# Patient Record
Sex: MALE | Race: OTHER | HISPANIC OR LATINO | URBAN - METROPOLITAN AREA
[De-identification: names, ages, dates, MRNs, and addresses within clinical notes are randomized per-mention and may not be internally consistent; named-entity substitution may affect disease eponyms.]

---

## 2017-09-16 ENCOUNTER — EMERGENCY (EMERGENCY)
Facility: HOSPITAL | Age: 32
LOS: 1 days | Discharge: HOME | End: 2017-09-16
Attending: EMERGENCY MEDICINE | Admitting: EMERGENCY MEDICINE
Payer: COMMERCIAL

## 2017-09-16 VITALS
HEART RATE: 78 BPM | SYSTOLIC BLOOD PRESSURE: 147 MMHG | RESPIRATION RATE: 18 BRPM | DIASTOLIC BLOOD PRESSURE: 92 MMHG | TEMPERATURE: 98 F | OXYGEN SATURATION: 100 %

## 2017-09-16 VITALS
OXYGEN SATURATION: 100 % | RESPIRATION RATE: 16 BRPM | TEMPERATURE: 98 F | HEART RATE: 85 BPM | SYSTOLIC BLOOD PRESSURE: 127 MMHG | DIASTOLIC BLOOD PRESSURE: 86 MMHG

## 2017-09-16 LAB
APPEARANCE UR: CLEAR — SIGNIFICANT CHANGE UP
BILIRUB UR-MCNC: NEGATIVE — SIGNIFICANT CHANGE UP
BLOOD UR QL VISUAL: NEGATIVE — SIGNIFICANT CHANGE UP
COLOR SPEC: YELLOW — SIGNIFICANT CHANGE UP
GLUCOSE UR-MCNC: NEGATIVE — SIGNIFICANT CHANGE UP
KETONES UR-MCNC: NEGATIVE — SIGNIFICANT CHANGE UP
LEUKOCYTE ESTERASE UR-ACNC: NEGATIVE — SIGNIFICANT CHANGE UP
MUCOUS THREADS # UR AUTO: SIGNIFICANT CHANGE UP
NITRITE UR-MCNC: NEGATIVE — SIGNIFICANT CHANGE UP
PH UR: 6 — SIGNIFICANT CHANGE UP (ref 4.6–8)
PROT UR-MCNC: NEGATIVE — SIGNIFICANT CHANGE UP
RBC CASTS # UR COMP ASSIST: SIGNIFICANT CHANGE UP (ref 0–?)
SP GR SPEC: 1.01 — SIGNIFICANT CHANGE UP (ref 1–1.03)
UROBILINOGEN FLD QL: NORMAL E.U. — SIGNIFICANT CHANGE UP (ref 0.1–0.2)
WBC UR QL: SIGNIFICANT CHANGE UP (ref 0–?)

## 2017-09-16 PROCEDURE — 76870 US EXAM SCROTUM: CPT | Mod: 26

## 2017-09-16 PROCEDURE — 99285 EMERGENCY DEPT VISIT HI MDM: CPT

## 2017-09-16 NOTE — ED ADULT TRIAGE NOTE - CHIEF COMPLAINT QUOTE
pt c/o right testicular pain since last night. pmd wants to r/o torsion.  pt c/o abd pain and nausea at this time.

## 2017-09-16 NOTE — ED PROVIDER NOTE - CARE PLAN
Principal Discharge DX:	Testicular pain, right  Instructions for follow-up, activity and diet:	Follow-up with your Primary Care Physician within 24-48 hours.  Please return to the Emergency Department immediately for any new, worsening or concerning symptoms; specifically those included in the attached information brochure.

## 2017-09-16 NOTE — ED PROVIDER NOTE - PLAN OF CARE
Follow-up with your Primary Care Physician within 24-48 hours.  Please return to the Emergency Department immediately for any new, worsening or concerning symptoms; specifically those included in the attached information brochure.

## 2017-09-16 NOTE — ED PROVIDER NOTE - MEDICAL DECISION MAKING DETAILS
32 year-old male presents to the ED for testicular pain. 32 year-old male presents to the ED for testicular pain since last night (> 6 hours).    DDx: testicular torsion, varicocele, epididymitis, hernia, muscle train, kidney stone, infection  Plan to rule-out torsion with US and check UA for eval of UTI/infection.  Follow-up with urology/PCP if testing negative.

## 2017-09-16 NOTE — ED ADULT NURSE NOTE - OBJECTIVE STATEMENT
Alert and oriented x 4. Pt received to spot 23 to R/O Testicular Torsion. Pt states : " I have testicular pain for the past 3 days. I went to PMD and he sent me to ER. No pain at this time." Pt states pain comes and goes and is 9/10 when he has the pain. Pt also experiences nausea when hes in pain. Pt denies chest pain, shortness of breath, nausea, vomiting or dizziness. VSS. Wife at bedside. Will continue to monitor.

## 2017-09-16 NOTE — ED PROVIDER NOTE - PHYSICAL EXAMINATION
*Gen: NAD, AAO*3, well-appearing, well-nourished  *HEENT: NC/AT, MMM, airway patent, trachea midline  *CV: RRR, S1/S2 present, no murmurs/rubs/gallops  *Resp: no respiratory distress, LCTAB, no wheezing/rales/rhonchi  *Abd: non-distended, soft N/Tx4, no guarding or rigidity  *Neuro: no focal neuro deficits, moving all limbs appropriately  *Extremities: no gross deformity, PMS*4  *Skin: no rashes, no wounds   ~ Henna Clark M.D. *Gen: NAD, AAO*3, well-appearing, well-nourished  *HEENT: NC/AT, MMM, airway patent, trachea midline  *CV: RRR, S1/S2 present, no murmurs/rubs/gallops  *Resp: no respiratory distress, LCTAB, no wheezing/rales/rhonchi  *Abd: non-distended, soft N/Tx4, no guarding or rigidity  *Neuro: no focal neuro deficits, moving all limbs appropriately  *Extremities: no gross deformity, PMS*4  *Skin: no rashes, no wounds   G/U: penis without lesions, urethral meatus normal location without discharge, testes and epididymides normal size without masses, right testicle high-riding and with lateral position, scrotum without lesions - no overlying redness/discoloration, cremasteric reflex not present, no inguinal hernia appreciated bilaterally.  ~ Henna Clark M.D.

## 2017-09-16 NOTE — ED PROVIDER NOTE - CHIEF COMPLAINT
The patient is a 32y Male complaining of The patient is a 32y Male complaining of right sided testicular pain.

## 2017-09-16 NOTE — ED PROVIDER NOTE - OBJECTIVE STATEMENT
31yo 32 year-old male w/ no pertinent past medical history presents to the ED for right sided testicular pain.  Patient 32 year-old male w/ no pertinent past medical history presents to the ED for right sided testicular pain.  Patient mentions he was moving around on the sofa last night when he had sudden onset stabbing right testicular pain with radiation into the right inguinal region.  Pain has been persistent since then with some worsening this AM.  Went to a urologist who sent patient to ED to rule-out testicular torsion.  No fevers, chills, vomiting, urinary disturbances.  Occasional nausea.  Non-bloody diarrhea since yesterday.  Prior history of kidney stones - patient says current episode does not feel like his prior episode.  No history of STDs - no current drainage.  No history of inguinal hernias.

## 2017-09-16 NOTE — ED PROVIDER NOTE - ATTENDING CONTRIBUTION TO CARE
I performed a face to face bedside interview with patient regarding history of present illness, review of symptoms and past medical history. I completed an independent physical exam.  I have discussed patient's plan of care.   I agree with note as stated above, having amended the EMR as needed to reflect my findings. I have discussed the assessment and plan of care.  This includes during the time I functioned as the attending physician for this patient.  Attending Contribution to Care: agree with plan of resident, pt p/w r testicular pain since yesterday. aching. no discharge, hematuria, polyuria, dysuria. pt stable for d/c pending neg us, ua

## 2017-09-16 NOTE — ED PROVIDER NOTE - NS ED ROS FT
*Constitutional: no fevers, no chills  *CV: no chest pain, no palpitations, no lightheadedness  *Resp: no shortness of breath, no cough, no wheezing  *GI: no abdominal pain, no nausea, no vomiting, no diarrhea, no constipation  *G/U: no dysuria, no hematuria  *Neuro: no headache, no lightheadedness/dizziness  *MSK: no joint pain, no swelling, no redness  *Skin: no rashes, no wounds  *Heme/Lymph: no bleeding, no bruising  *Allergic/Immunologic: no environmental allergies, no food allergies, no immunosuppressive disorder   ~ Henna Clark M.D.

## 2017-09-18 LAB
BACTERIA UR CULT: SIGNIFICANT CHANGE UP
SPECIMEN SOURCE: SIGNIFICANT CHANGE UP

## 2018-04-11 ENCOUNTER — TRANSCRIPTION ENCOUNTER (OUTPATIENT)
Age: 33
End: 2018-04-11

## 2020-02-19 ENCOUNTER — EMERGENCY (EMERGENCY)
Facility: HOSPITAL | Age: 35
LOS: 1 days | Discharge: HOME | End: 2020-02-19
Attending: EMERGENCY MEDICINE
Payer: COMMERCIAL

## 2020-02-19 VITALS
HEART RATE: 95 BPM | TEMPERATURE: 100 F | SYSTOLIC BLOOD PRESSURE: 127 MMHG | OXYGEN SATURATION: 100 % | RESPIRATION RATE: 18 BRPM | DIASTOLIC BLOOD PRESSURE: 72 MMHG

## 2020-02-19 VITALS
WEIGHT: 166.89 LBS | HEIGHT: 70 IN | TEMPERATURE: 98 F | SYSTOLIC BLOOD PRESSURE: 133 MMHG | RESPIRATION RATE: 20 BRPM | OXYGEN SATURATION: 99 % | HEART RATE: 99 BPM | DIASTOLIC BLOOD PRESSURE: 77 MMHG

## 2020-02-19 PROBLEM — I86.1 SCROTAL VARICES: Chronic | Status: ACTIVE | Noted: 2017-09-16

## 2020-02-19 LAB
ALBUMIN SERPL ELPH-MCNC: 3.8 G/DL — SIGNIFICANT CHANGE UP (ref 3.5–5)
ALP SERPL-CCNC: 68 U/L — SIGNIFICANT CHANGE UP (ref 40–120)
ALT FLD-CCNC: 31 U/L DA — SIGNIFICANT CHANGE UP (ref 10–60)
ANION GAP SERPL CALC-SCNC: 5 MMOL/L — SIGNIFICANT CHANGE UP (ref 5–17)
AST SERPL-CCNC: 17 U/L — SIGNIFICANT CHANGE UP (ref 10–40)
BASOPHILS # BLD AUTO: 0.03 K/UL — SIGNIFICANT CHANGE UP (ref 0–0.2)
BASOPHILS NFR BLD AUTO: 0.3 % — SIGNIFICANT CHANGE UP (ref 0–2)
BILIRUB SERPL-MCNC: 0.6 MG/DL — SIGNIFICANT CHANGE UP (ref 0.2–1.2)
BUN SERPL-MCNC: 12 MG/DL — SIGNIFICANT CHANGE UP (ref 7–18)
CALCIUM SERPL-MCNC: 9.1 MG/DL — SIGNIFICANT CHANGE UP (ref 8.4–10.5)
CHLORIDE SERPL-SCNC: 106 MMOL/L — SIGNIFICANT CHANGE UP (ref 96–108)
CO2 SERPL-SCNC: 28 MMOL/L — SIGNIFICANT CHANGE UP (ref 22–31)
CREAT SERPL-MCNC: 1.23 MG/DL — SIGNIFICANT CHANGE UP (ref 0.5–1.3)
CULTURE RESULTS: SIGNIFICANT CHANGE UP
EOSINOPHIL # BLD AUTO: 0.06 K/UL — SIGNIFICANT CHANGE UP (ref 0–0.5)
EOSINOPHIL NFR BLD AUTO: 0.5 % — SIGNIFICANT CHANGE UP (ref 0–6)
GLUCOSE SERPL-MCNC: 105 MG/DL — HIGH (ref 70–99)
HCT VFR BLD CALC: 48.6 % — SIGNIFICANT CHANGE UP (ref 39–50)
HGB BLD-MCNC: 16.3 G/DL — SIGNIFICANT CHANGE UP (ref 13–17)
IMM GRANULOCYTES NFR BLD AUTO: 0.4 % — SIGNIFICANT CHANGE UP (ref 0–1.5)
LIDOCAIN IGE QN: 123 U/L — SIGNIFICANT CHANGE UP (ref 73–393)
LYMPHOCYTES # BLD AUTO: 0.59 K/UL — LOW (ref 1–3.3)
LYMPHOCYTES # BLD AUTO: 5.4 % — LOW (ref 13–44)
MCHC RBC-ENTMCNC: 31.3 PG — SIGNIFICANT CHANGE UP (ref 27–34)
MCHC RBC-ENTMCNC: 33.5 GM/DL — SIGNIFICANT CHANGE UP (ref 32–36)
MCV RBC AUTO: 93.5 FL — SIGNIFICANT CHANGE UP (ref 80–100)
MONOCYTES # BLD AUTO: 0.63 K/UL — SIGNIFICANT CHANGE UP (ref 0–0.9)
MONOCYTES NFR BLD AUTO: 5.7 % — SIGNIFICANT CHANGE UP (ref 2–14)
NEUTROPHILS # BLD AUTO: 9.64 K/UL — HIGH (ref 1.8–7.4)
NEUTROPHILS NFR BLD AUTO: 87.7 % — HIGH (ref 43–77)
NRBC # BLD: 0 /100 WBCS — SIGNIFICANT CHANGE UP (ref 0–0)
PLATELET # BLD AUTO: 135 K/UL — LOW (ref 150–400)
POTASSIUM SERPL-MCNC: 4 MMOL/L — SIGNIFICANT CHANGE UP (ref 3.5–5.3)
POTASSIUM SERPL-SCNC: 4 MMOL/L — SIGNIFICANT CHANGE UP (ref 3.5–5.3)
PROT SERPL-MCNC: 7.7 G/DL — SIGNIFICANT CHANGE UP (ref 6–8.3)
RBC # BLD: 5.2 M/UL — SIGNIFICANT CHANGE UP (ref 4.2–5.8)
RBC # FLD: 12.6 % — SIGNIFICANT CHANGE UP (ref 10.3–14.5)
SODIUM SERPL-SCNC: 139 MMOL/L — SIGNIFICANT CHANGE UP (ref 135–145)
SPECIMEN SOURCE: SIGNIFICANT CHANGE UP
WBC # BLD: 10.99 K/UL — HIGH (ref 3.8–10.5)
WBC # FLD AUTO: 10.99 K/UL — HIGH (ref 3.8–10.5)

## 2020-02-19 PROCEDURE — 83690 ASSAY OF LIPASE: CPT

## 2020-02-19 PROCEDURE — 80053 COMPREHEN METABOLIC PANEL: CPT

## 2020-02-19 PROCEDURE — 36415 COLL VENOUS BLD VENIPUNCTURE: CPT

## 2020-02-19 PROCEDURE — 87207 SMEAR SPECIAL STAIN: CPT

## 2020-02-19 PROCEDURE — 99283 EMERGENCY DEPT VISIT LOW MDM: CPT

## 2020-02-19 PROCEDURE — 85027 COMPLETE CBC AUTOMATED: CPT

## 2020-02-19 RX ORDER — SODIUM CHLORIDE 9 MG/ML
1000 INJECTION INTRAMUSCULAR; INTRAVENOUS; SUBCUTANEOUS ONCE
Refills: 0 | Status: COMPLETED | OUTPATIENT
Start: 2020-02-19 | End: 2020-02-19

## 2020-02-19 RX ORDER — AZITHROMYCIN 500 MG/1
500 TABLET, FILM COATED ORAL ONCE
Refills: 0 | Status: COMPLETED | OUTPATIENT
Start: 2020-02-19 | End: 2020-02-19

## 2020-02-19 RX ADMIN — SODIUM CHLORIDE 1000 MILLILITER(S): 9 INJECTION INTRAMUSCULAR; INTRAVENOUS; SUBCUTANEOUS at 11:32

## 2020-02-19 RX ADMIN — AZITHROMYCIN 500 MILLIGRAM(S): 500 TABLET, FILM COATED ORAL at 11:32

## 2020-02-19 NOTE — ED ADULT NURSE NOTE - NSIMPLEMENTINTERV_GEN_ALL_ED
Implemented All Universal Safety Interventions:  [place] to call system. Call bell, personal items and telephone within reach. Instruct patient to call for assistance. Room bathroom lighting operational. Non-slip footwear when patient is off stretcher. Physically safe environment: no spills, clutter or unnecessary equipment. Stretcher in lowest position, wheels locked, appropriate side rails in place.

## 2020-02-19 NOTE — ED PROVIDER NOTE - PATIENT PORTAL LINK FT
You can access the FollowMyHealth Patient Portal offered by [facility] by registering at the following website: [email or web address]. By joining [facility]’s FollowMyHealth portal, you will also be able to view your health information using other applications (apps) compatible with our system.

## 2020-02-19 NOTE — ED PROVIDER NOTE - CLINICAL SUMMARY MEDICAL DECISION MAKING FREE TEXT BOX
Patient with benign abdominal exam. No indication for CT scan and surgical pathology unlikely. Likely travelers diarrhea, single dose of Azithromycin, reviewed CDC website and travel. Low risk ffor Malaria, yellow fever, Hepatitis A. Will screen for blood work and advise pt 1 test for Malaria. If patient still has fever, will follow up with PMD in 1-2 days.

## 2020-02-19 NOTE — ED PROVIDER NOTE - NSFOLLOWUPINSTRUCTIONS_ED_ALL_ED_FT
IMPORTANT INSTRUCTIONS FROM Dr. MOORE:    Please follow up with your personal medical doctor in 24-48 hours.   Bring results from today to your visit.    Malaria test is preliminary neg but still pending but is not necessarily conclusive. Be sure to see your doctor.     See attached instructions.  If you were advised to take any medications - be sure to review the package insert.    If your symptoms change, get worse or if you have any new symptoms, come to the ER right away.  If you have any questions, call the ER at the phone number on this page.

## 2020-02-19 NOTE — ED PROVIDER NOTE - PROGRESS NOTE DETAILS
feeling better and wants to go home, reviewed case and results w pt, questions answered and pt understands, advised return precautions and care plan.

## 2020-02-19 NOTE — ED ADULT TRIAGE NOTE - NSWEIGHTCALCTOOLDRUG_GEN_A_CORE
Teenager with EoE-Food impaction/dysphagia now on high dose Flovent and no symptoms. Rescope in 2 weeks to evaluated response and decide on the maintenance treatment.    Recommendations:   Diet: Healthy diet with no restrictions.  Medications: Continue flovent same dose until he is seen back in the EoE clinic  Tests:EGD is recommended in 2 weeks  Food allergy skin testing this month is recommended.  Follow up in 2 weeks after the EGD.    Educaiton provided. Resources: [employer]org  I discussed the diagnosis, management plan and the above recommendations in detail with the parents along with the long term risks and benefits involved. Parents are in agreement and understanding of the above plan.   Please contact my office for any questions or concerns.[phone]  KG      used

## 2020-02-19 NOTE — ED PROVIDER NOTE - OBJECTIVE STATEMENT
35 y/o M w/ no significant PMHx presents to the ED with abdominal pain. Patient reports x2 days ago he returned from  and began having a fever as high as 103. Patient endorses non-bloody diarrhea and mild LUQ abdominal pain with no vomiting. Patient says he took Tylenol and Motrin to no relief. Patient denies antibiotics use. Patient denies any sick contacts or URI symptoms. Patient denies chest pain, SOB, neurological deficits, and any other complaints. NKDA.

## 2020-10-21 ENCOUNTER — APPOINTMENT (OUTPATIENT)
Dept: ORTHOPEDIC SURGERY | Facility: CLINIC | Age: 35
End: 2020-10-21
Payer: COMMERCIAL

## 2020-10-21 VITALS
SYSTOLIC BLOOD PRESSURE: 141 MMHG | DIASTOLIC BLOOD PRESSURE: 83 MMHG | BODY MASS INDEX: 27.92 KG/M2 | WEIGHT: 195 LBS | HEIGHT: 70 IN | HEART RATE: 83 BPM

## 2020-10-21 DIAGNOSIS — R22.31 LOCALIZED SWELLING, MASS AND LUMP, RIGHT UPPER LIMB: ICD-10-CM

## 2020-10-21 PROCEDURE — 99204 OFFICE O/P NEW MOD 45 MIN: CPT

## 2020-10-21 PROCEDURE — 73130 X-RAY EXAM OF HAND: CPT | Mod: RT

## 2020-10-21 PROCEDURE — 99072 ADDL SUPL MATRL&STAF TM PHE: CPT

## 2020-10-23 ENCOUNTER — OUTPATIENT (OUTPATIENT)
Dept: OUTPATIENT SERVICES | Facility: HOSPITAL | Age: 35
LOS: 1 days | End: 2020-10-23
Payer: COMMERCIAL

## 2020-10-23 VITALS
RESPIRATION RATE: 18 BRPM | HEART RATE: 61 BPM | TEMPERATURE: 98 F | SYSTOLIC BLOOD PRESSURE: 138 MMHG | OXYGEN SATURATION: 100 % | WEIGHT: 195.11 LBS | HEIGHT: 70 IN | DIASTOLIC BLOOD PRESSURE: 91 MMHG

## 2020-10-23 DIAGNOSIS — S63.652D: ICD-10-CM

## 2020-10-23 DIAGNOSIS — S63.269A DISLOCATION OF METACARPOPHALANGEAL JOINT OF UNSPECIFIED FINGER, INITIAL ENCOUNTER: ICD-10-CM

## 2020-10-23 DIAGNOSIS — S93.529A SPRAIN OF METATARSOPHALANGEAL JOINT OF UNSPECIFIED TOE(S), INITIAL ENCOUNTER: ICD-10-CM

## 2020-10-23 DIAGNOSIS — Z01.818 ENCOUNTER FOR OTHER PREPROCEDURAL EXAMINATION: ICD-10-CM

## 2020-10-23 DIAGNOSIS — M25.511 PAIN IN RIGHT SHOULDER: Chronic | ICD-10-CM

## 2020-10-23 DIAGNOSIS — R22.31 LOCALIZED SWELLING, MASS AND LUMP, RIGHT UPPER LIMB: ICD-10-CM

## 2020-10-23 PROCEDURE — G0463: CPT

## 2020-10-23 PROCEDURE — U0003: CPT

## 2020-10-23 RX ORDER — SODIUM CHLORIDE 9 MG/ML
3 INJECTION INTRAMUSCULAR; INTRAVENOUS; SUBCUTANEOUS EVERY 8 HOURS
Refills: 0 | Status: DISCONTINUED | OUTPATIENT
Start: 2020-10-27 | End: 2020-11-10

## 2020-10-23 RX ORDER — LIDOCAINE HCL 20 MG/ML
0.2 VIAL (ML) INJECTION ONCE
Refills: 0 | Status: DISCONTINUED | OUTPATIENT
Start: 2020-10-27 | End: 2020-11-10

## 2020-10-23 NOTE — H&P PST ADULT - NSICDXPROBLEM_GEN_ALL_CORE_FT
PROBLEM DIAGNOSES  Problem: Sprain of metatarsophalangeal joint  Assessment and Plan: Right hand middle finger arthroscopy of metacarpohalangeal joint, possible cyst excsion on 10/27  Preop instructions and chlorhexidine soap provided  Covid test performed- f/u results
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
## 2020-10-23 NOTE — H&P PST ADULT - HISTORY OF PRESENT ILLNESS
This is a 35 year old male with no signficant past medical hsitory This is a 35 year old male with no significant past medical history Reports having right finger pain for last several years unsure if had childhood injury to the hand, however states pain is worsening over last several months. Now presenting to PST for right hand middle finger arthroscopy of metaphalangeal joint, possible cyst excision on 10/27 with Dr. Leroy.       ** Follow up Covid test results

## 2020-10-24 LAB — SARS-COV-2 RNA SPEC QL NAA+PROBE: SIGNIFICANT CHANGE UP

## 2020-10-26 ENCOUNTER — TRANSCRIPTION ENCOUNTER (OUTPATIENT)
Age: 35
End: 2020-10-26

## 2020-10-26 RX ORDER — OXYCODONE 5 MG/1
5 TABLET ORAL
Qty: 10 | Refills: 0 | Status: ACTIVE | COMMUNITY
Start: 2020-10-26 | End: 1900-01-01

## 2020-10-26 RX ORDER — IBUPROFEN 800 MG/1
800 TABLET, FILM COATED ORAL 3 TIMES DAILY
Qty: 90 | Refills: 0 | Status: ACTIVE | COMMUNITY
Start: 2020-10-26 | End: 1900-01-01

## 2020-10-26 RX ORDER — ACETAMINOPHEN 500 MG/1
500 TABLET, COATED ORAL
Qty: 180 | Refills: 0 | Status: ACTIVE | COMMUNITY
Start: 2020-10-26 | End: 1900-01-01

## 2020-10-27 ENCOUNTER — OUTPATIENT (OUTPATIENT)
Dept: OUTPATIENT SERVICES | Facility: HOSPITAL | Age: 35
LOS: 1 days | End: 2020-10-27
Payer: COMMERCIAL

## 2020-10-27 ENCOUNTER — APPOINTMENT (OUTPATIENT)
Dept: ORTHOPEDIC SURGERY | Facility: HOSPITAL | Age: 35
End: 2020-10-27

## 2020-10-27 VITALS
OXYGEN SATURATION: 100 % | TEMPERATURE: 98 F | DIASTOLIC BLOOD PRESSURE: 79 MMHG | WEIGHT: 195.11 LBS | HEIGHT: 70 IN | RESPIRATION RATE: 16 BRPM | HEART RATE: 73 BPM | SYSTOLIC BLOOD PRESSURE: 131 MMHG

## 2020-10-27 VITALS
DIASTOLIC BLOOD PRESSURE: 71 MMHG | TEMPERATURE: 98 F | OXYGEN SATURATION: 99 % | HEART RATE: 82 BPM | SYSTOLIC BLOOD PRESSURE: 115 MMHG | RESPIRATION RATE: 18 BRPM

## 2020-10-27 DIAGNOSIS — S63.652D: ICD-10-CM

## 2020-10-27 DIAGNOSIS — R22.31 LOCALIZED SWELLING, MASS AND LUMP, RIGHT UPPER LIMB: ICD-10-CM

## 2020-10-27 DIAGNOSIS — M25.511 PAIN IN RIGHT SHOULDER: Chronic | ICD-10-CM

## 2020-10-27 PROCEDURE — 29901 MCP JOINT ARTHROSCOPY SURG: CPT | Mod: F7

## 2020-10-27 PROCEDURE — 20550 NJX 1 TENDON SHEATH/LIGAMENT: CPT | Mod: F8

## 2020-10-27 PROCEDURE — 29999 UNLISTED PX ARTHROSCOPY: CPT

## 2020-10-27 RX ORDER — ONDANSETRON 8 MG/1
4 TABLET, FILM COATED ORAL ONCE
Refills: 0 | Status: DISCONTINUED | OUTPATIENT
Start: 2020-10-27 | End: 2020-11-10

## 2020-10-27 RX ORDER — FENTANYL CITRATE 50 UG/ML
25 INJECTION INTRAVENOUS
Refills: 0 | Status: DISCONTINUED | OUTPATIENT
Start: 2020-10-27 | End: 2020-10-27

## 2020-10-27 RX ORDER — SODIUM CHLORIDE 9 MG/ML
1000 INJECTION, SOLUTION INTRAVENOUS
Refills: 0 | Status: DISCONTINUED | OUTPATIENT
Start: 2020-10-27 | End: 2020-11-10

## 2020-10-27 RX ORDER — OXYCODONE HYDROCHLORIDE 5 MG/1
5 TABLET ORAL ONCE
Refills: 0 | Status: DISCONTINUED | OUTPATIENT
Start: 2020-10-27 | End: 2020-10-27

## 2020-10-27 RX ORDER — ESCITALOPRAM OXALATE 10 MG/1
1 TABLET, FILM COATED ORAL
Qty: 0 | Refills: 0 | DISCHARGE

## 2020-10-27 NOTE — ASU DISCHARGE PLAN (ADULT/PEDIATRIC) - CARE PROVIDER_API CALL
Amara Leroy)  [address], [facility] [address]  [place] [address]  Phone: [phone]  Fax: [phone]  Follow Up Time:
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
## 2020-10-28 ENCOUNTER — TRANSCRIPTION ENCOUNTER (OUTPATIENT)
Age: 35
End: 2020-10-28

## 2020-11-03 ENCOUNTER — APPOINTMENT (OUTPATIENT)
Dept: ORTHOPEDIC SURGERY | Facility: CLINIC | Age: 35
End: 2020-11-03
Payer: COMMERCIAL

## 2020-11-03 PROCEDURE — XXXXX: CPT

## 2020-11-11 ENCOUNTER — APPOINTMENT (OUTPATIENT)
Dept: ORTHOPEDIC SURGERY | Facility: CLINIC | Age: 35
End: 2020-11-11
Payer: COMMERCIAL

## 2020-11-11 VITALS — BODY MASS INDEX: 27.92 KG/M2 | HEIGHT: 70 IN | WEIGHT: 195 LBS

## 2020-11-11 DIAGNOSIS — S63.652D SPRAIN OF METACARPOPHALANGEAL JOINT OF RIGHT MIDDLE FINGER, SUBSEQUENT ENCOUNTER: ICD-10-CM

## 2020-11-11 DIAGNOSIS — M65.342 TRIGGER FINGER, LEFT RING FINGER: ICD-10-CM

## 2020-11-11 PROCEDURE — 20550 NJX 1 TENDON SHEATH/LIGAMENT: CPT | Mod: 58,F7

## 2020-11-11 PROCEDURE — 99024 POSTOP FOLLOW-UP VISIT: CPT

## 2021-01-21 NOTE — H&P PST ADULT - CONSTITUTIONAL DETAILS
Render Post-Care Instructions In Note?: no
Number Of Freeze-Thaw Cycles: 2 freeze-thaw cycles
Duration Of Freeze Thaw-Cycle (Seconds): 10
Detail Level: Simple
Post-Care Instructions: I reviewed with the patient in detail post-care instructions. Patient is to wear sunprotection, and avoid picking at any of the treated lesions. Pt may apply Vaseline to crusted or scabbing areas.
Consent: The patient's consent was obtained including but not limited to risks of crusting, scabbing, blistering, scarring, darker or lighter pigmentary change, recurrence, incomplete removal and infection.
well-developed
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
## 2021-03-25 PROBLEM — F41.9 ANXIETY DISORDER, UNSPECIFIED: Chronic | Status: ACTIVE | Noted: 2020-10-23

## 2021-03-29 ENCOUNTER — APPOINTMENT (OUTPATIENT)
Dept: ORTHOPEDIC SURGERY | Facility: CLINIC | Age: 36
End: 2021-03-29
Payer: COMMERCIAL

## 2021-03-29 VITALS — BODY MASS INDEX: 27.92 KG/M2 | WEIGHT: 195 LBS | HEIGHT: 70 IN

## 2021-03-29 DIAGNOSIS — M65.341 TRIGGER FINGER, RIGHT RING FINGER: ICD-10-CM

## 2021-03-29 PROCEDURE — 99072 ADDL SUPL MATRL&STAF TM PHE: CPT

## 2021-03-29 PROCEDURE — 99214 OFFICE O/P EST MOD 30 MIN: CPT | Mod: 25

## 2021-03-29 PROCEDURE — 20550 NJX 1 TENDON SHEATH/LIGAMENT: CPT | Mod: F8

## 2022-10-12 NOTE — ED PROVIDER NOTE - SOCIAL CONCERNS
Anesthesia Pre Eval Note    Anesthesia ROS/Med Hx        Anesthetic Complication History:  Patient does not have a history of anesthetic complications      Pulmonary Review:  Patient does not have a pulmonary history      Neuro/Psych Review:  Comments: Scoliosis,DDD       Cardiovascular Review:    Positive for hypertension    GI/HEPATIC/RENAL Review:    Positive for renal disease - nephrolithiasis    End/Other Review:    Positive for obesity class I - 30.00 - 34.99  Positive for chronic pain  Additional Results:     ALLERGIES:   -- Amoxicillin -- ARTHRALGIA   -- Lisinopril -- Cough       Last Labs        Component                Value               Date/Time                  WBC                      5.8                 09/04/2022 2210            RBC                      4.24                09/04/2022 2210            HGB                      12.2                09/04/2022 2210            HCT                      37.2                09/04/2022 2210            MCV                      87.7                09/04/2022 2210            MCH                      28.8                09/04/2022 2210            MCHC                     32.8                09/04/2022 2210            RDW-CV                   12.7                09/04/2022 2210            Sodium                   136                 09/04/2022 2210            Potassium                3.8                 09/04/2022 2210            Chloride                 100                 09/04/2022 2210            Carbon Dioxide           27                  09/04/2022 2210            Glucose                  81                  09/04/2022 2210            BUN                      13                  09/04/2022 2210            Creatinine               0.58                09/04/2022 2210            Glomerular Filtrati*     >90                 09/04/2022 2210            Calcium                  8.7                 09/04/2022 2210            PLT                      265                  09/04/2022 2210        Past Medical History:  01/12/2021: Chronic bilateral low back pain with bilateral sciatica  No date: Essential (primary) hypertension  No date: Kidney stones  No date: Low back pain  No date: Low back pain  No date: Menorrhagia with regular cycle  No date: Scoliosis  No date: Urinary tract infection      Comment:  tx with abx, still has burning    Past Surgical History:  06/10/2021: Lumbar disc surgery      Comment:  Anterior lumbar 5/sacral 1 total disc replacement, by Dr Braydon Miranda, [place]  No date: Tubal ligation       Prior to Admission medications :  Medication sulfamethoxazole-trimethoprim (Bactrim DS) 800-160 MG per tablet, Sig Take 1 tablet by mouth in the morning and 1 tablet in the evening. Do all this for 10 days., Start Date 10/6/22, End Date 10/16/22, Taking? Yes, Authorizing Provider Rivera Be MD    Medication tamsulosin (FLOMAX) 0.4 MG Cap, Sig Take 1 capsule by mouth daily after a meal.  Patient taking differently: Take 0.4 mg by mouth every morning., Start Date 9/6/22, End Date 3/5/23, Taking? Yes, Authorizing Provider Rivera Be MD    Medication ferrous sulfate 325 (65 FE) MG tablet, Sig Take 325 mg by mouth daily (with breakfast)., Start Date , End Date , Taking? Yes, Authorizing Provider Outside Provider    Medication Multiple Vitamin (MULTIVITAMIN PO), Sig Take 1 tablet by mouth daily. Stop now, Start Date , End Date , Taking? Yes, Authorizing Provider Outside Provider    Medication HYDROcodone-acetaminophen (NORCO) 5-325 MG per tablet, Sig Take 1 tablet by mouth every 6 hours as needed for Pain., Start Date 8/30/22, End Date , Taking? , Authorizing Provider Susan Estrada MD         Patient Vitals in the past 24 hrs:  10/12/22 1140, BP:(!) 140/98, Temp:36.4 °C (97.6 °F), Pulse:64, Resp:14, SpO2:100 %, Height:4' 11\" (1.499 m), Weight:69.6 kg (153 lb 7 oz)      Relevant Problems   No relevant active problems       Physical Exam     Airway    Mallampati: II  TM Distance: >3 FB  Neck ROM: Full  Neck: Non-tender and Able to place in sniff position  TMJ Mobility: Good    Cardiovascular  Cardiovascular exam normal  Cardio Rhythm: Regular  Cardio Rate: Normal    Head Assessment  Head assessment: Normocephalic and Atraumatic    General Assessment  General Assessment: Alert and oriented and No acute distress    Dental Exam  Dental exam normal    Pulmonary Exam  Pulmonary exam normal  Breath sounds clear to auscultation:  Yes    Abdominal Exam  Abdominal exam normal      Anesthesia Plan:    ASA Status: 2  Anesthesia Type: General    Induction: Intravenous  Preferred Airway Type: LMA  Maintenance: Inhalational    Post-op Pain Management: Per Surgeon      Checklist  Reviewed: NPO Status, Allergies, Medications, Problem list and Past Med History  Consent/Risks Discussed Statement:  The proposed anesthetic plan, including its risks and benefits, have been discussed with the Patient along with the risks and benefits of alternatives. Questions were encouraged and answered and the patient and/or representative understands and agrees to proceed.        I discussed with the patient (and/or patient's legal representative) the risks and benefits of the proposed anesthesia plan, General, which may include services performed by other anesthesia providers.    Alternative anesthesia plans, if available, were reviewed with the patient (and/or patient's legal representative). Discussion has been held with the patient (and/or patient's legal representative) regarding risks of anesthesia, which include vomiting, nausea and dental injury and emergent situations that may require change in anesthesia plan.    The patient (and/or patient's legal representative) has indicated understanding, his/her questions have been answered, and he/she wishes to proceed with the planned anesthetic.    Blood Products: Not Anticipated     None

## 2024-01-29 NOTE — ASU PREOP CHECKLIST - SKIN PREP
[FreeTextEntry1] : Patient feels well, he is asymptomatic. No weight change.  He is exercising regularly and following the diet. He denies low blood glucose during the night. He denies chest pain, or SOB. Denies numbness, tingling or burning sensation on his extremities. Taking his medications regularly. He has not seen the Ophthalmologist recently. He has not seen the Podiatrist recently. He has not seen the Cardiologist recently. He has not been using the testosterone for more than 6 months. done

## 2025-03-12 NOTE — ASU PATIENT PROFILE, ADULT - PREOP PAIN SCORE
Dr. Hart,    Please sign off on form if you agree to: disability due to Primary osteoarthritis of first carpometacarpal joint of left hand. Start date 02/12/25-03/31/25. Letter for Return to work 04/01/25 on file.    -Signature page will be the first page scanned  -From your Inbasket, Highlight the patient and click Chart   -Double click the 02/28/2025 Forms Completion telephone encounter  -Scroll down to the Media section   -Click the blue Hyperlink: disability Dr Hart 03/12/25  -Click Acknowledge located in the top right ribbon/menu   -Drag the mouse into the blank space of the document and a + sign will appear. Left click to   electronically sign the document.  -Once signed, simply exit out of the screen and you signature will be saved.     Thank you,  Nicole       0

## 2025-06-24 NOTE — H&P PST ADULT - VISION (WITH CORRECTIVE LENSES IF THE PATIENT USUALLY WEARS THEM):
.Outgoing call placed to patient, patient verified identifiers, patient was notified that a message was received from pre-admit that he took blood thinners after recommended stop date, patient initially confirmed what was said and once notified that we would have to reschedule his surgery since he did take the medication and he then stated that he wasn't sure of the last day but it was a week ago then patient stated he is no longer taking the medication, patient notified that it would appear suspicious if he is now changing his answer after hearing he would have to be rescheduled which he did not want to do. Dr. Bajwa was notified of the patient's response and stated she would call and speak with him. Dr. Bajwa did speak with patient and provided the below response. Message forwarded to Malathi Chandler RN who send the original message.    ----- Message from Patricia Bajwa MD sent at 6/24/2025  4:43 PM CDT -----  Pt says he is no longer on plavix for the past 6 months and that he stopped ASA last week. I did discuss that there is bleeding risk if he is on it. Will proceed.  ----- Message -----  From: Juvenal Owens RN  Sent: 6/24/2025   2:57 PM CDT  To: Patricia Bajwa MD    Please advise.  ----- Message -----  From: Malathi Chandler RN  Sent: 6/24/2025   1:54 PM CDT  To: Viola Aguilar RN; Juvenal Owens RN; Malathi #    Hello,    This patient states he last took his Aspirin on 6/23/25 and his Plavix on 6/22/25 as instructed to do so by Dr. Bajwa's office. I wanted to verify that it will  be okay to proceed with surgery on Thursday 6/26/25.      Thank you  
Normal vision: sees adequately in most situations; can see medication labels, newsprint